# Patient Record
Sex: FEMALE | Race: OTHER
[De-identification: names, ages, dates, MRNs, and addresses within clinical notes are randomized per-mention and may not be internally consistent; named-entity substitution may affect disease eponyms.]

---

## 2019-01-20 ENCOUNTER — HOSPITAL ENCOUNTER (EMERGENCY)
Dept: HOSPITAL 12 - ER | Age: 74
Discharge: HOME | End: 2019-01-20
Payer: COMMERCIAL

## 2019-01-20 VITALS — WEIGHT: 140 LBS | BODY MASS INDEX: 27.48 KG/M2 | HEIGHT: 60 IN

## 2019-01-20 VITALS — SYSTOLIC BLOOD PRESSURE: 139 MMHG | DIASTOLIC BLOOD PRESSURE: 84 MMHG

## 2019-01-20 DIAGNOSIS — Z79.899: ICD-10-CM

## 2019-01-20 DIAGNOSIS — H83.09: Primary | ICD-10-CM

## 2019-01-20 DIAGNOSIS — E11.9: ICD-10-CM

## 2019-01-20 PROCEDURE — A4663 DIALYSIS BLOOD PRESSURE CUFF: HCPCS

## 2019-03-11 ENCOUNTER — HOSPITAL ENCOUNTER (EMERGENCY)
Dept: HOSPITAL 12 - ER | Age: 74
Discharge: HOME | End: 2019-03-11
Payer: COMMERCIAL

## 2019-03-11 VITALS — SYSTOLIC BLOOD PRESSURE: 119 MMHG | DIASTOLIC BLOOD PRESSURE: 71 MMHG

## 2019-03-11 VITALS — WEIGHT: 150 LBS | HEIGHT: 60 IN | BODY MASS INDEX: 29.45 KG/M2

## 2019-03-11 DIAGNOSIS — Z79.899: ICD-10-CM

## 2019-03-11 DIAGNOSIS — M54.12: Primary | ICD-10-CM

## 2019-03-11 PROCEDURE — A4663 DIALYSIS BLOOD PRESSURE CUFF: HCPCS

## 2019-03-11 NOTE — NUR
Patient discharged to home in stable conditon.  Written and verbal after care 
instructions given. 

Patient verbalizes understanding of instructions.PT WALKS I NSYOSI AGIT. 
TRANSLATION FROM Turkmen PROVIDED BY FAMILY MEMBER.

## 2020-01-13 ENCOUNTER — HOSPITAL ENCOUNTER (EMERGENCY)
Dept: HOSPITAL 12 - ER | Age: 75
Discharge: HOME | End: 2020-01-13
Payer: MEDICAID

## 2020-01-13 VITALS — BODY MASS INDEX: 28.27 KG/M2 | HEIGHT: 60 IN | WEIGHT: 144 LBS

## 2020-01-13 VITALS — SYSTOLIC BLOOD PRESSURE: 130 MMHG | DIASTOLIC BLOOD PRESSURE: 79 MMHG

## 2020-01-13 DIAGNOSIS — Z79.84: ICD-10-CM

## 2020-01-13 DIAGNOSIS — R11.2: Primary | ICD-10-CM

## 2020-01-13 DIAGNOSIS — E11.9: ICD-10-CM

## 2020-01-13 LAB
ALP SERPL-CCNC: 214 U/L (ref 50–136)
ALT SERPL W/O P-5'-P-CCNC: 47 U/L (ref 14–59)
APPEARANCE UR: (no result)
AST SERPL-CCNC: 31 U/L (ref 15–37)
BASOPHILS # BLD AUTO: 0.1 K/UL (ref 0–8)
BASOPHILS NFR BLD AUTO: 0.5 % (ref 0–2)
BILIRUB DIRECT SERPL-MCNC: 0.1 MG/DL (ref 0–0.2)
BILIRUB SERPL-MCNC: 0.4 MG/DL (ref 0.2–1)
BILIRUB UR QL STRIP: NEGATIVE
BUN SERPL-MCNC: 14 MG/DL (ref 7–18)
CHLORIDE SERPL-SCNC: 101 MMOL/L (ref 98–107)
CO2 SERPL-SCNC: 27 MMOL/L (ref 21–32)
COLOR UR: YELLOW
CREAT SERPL-MCNC: 0.8 MG/DL (ref 0.6–1.3)
DEPRECATED SQUAMOUS URNS QL MICRO: (no result) /HPF
EOSINOPHIL # BLD AUTO: 0 K/UL (ref 0–0.7)
EOSINOPHIL NFR BLD AUTO: 0.5 % (ref 0–7)
GLUCOSE SERPL-MCNC: 231 MG/DL (ref 74–106)
GLUCOSE UR STRIP-MCNC: NEGATIVE MG/DL
HCT VFR BLD AUTO: 42.3 % (ref 31.2–41.9)
HGB BLD-MCNC: 14.1 G/DL (ref 10.9–14.3)
HGB UR QL STRIP: NEGATIVE
KETONES UR STRIP-MCNC: NEGATIVE MG/DL
LEUKOCYTE ESTERASE UR QL STRIP: NEGATIVE
LIPASE SERPL-CCNC: 114 U/L (ref 73–393)
LYMPHOCYTES # BLD AUTO: 1.8 K/UL (ref 20–40)
LYMPHOCYTES NFR BLD AUTO: 17.1 % (ref 20.5–51.5)
MCH RBC QN AUTO: 28.8 UUG (ref 24.7–32.8)
MCHC RBC AUTO-ENTMCNC: 33 G/DL (ref 32.3–35.6)
MCV RBC AUTO: 86.3 FL (ref 75.5–95.3)
MONOCYTES # BLD AUTO: 0.5 K/UL (ref 2–10)
MONOCYTES NFR BLD AUTO: 5.1 % (ref 0–11)
MUCOUS THREADS URNS QL MICRO: (no result) /LPF
NEUTROPHILS # BLD AUTO: 8 K/UL (ref 1.8–8.9)
NEUTROPHILS NFR BLD AUTO: 76.8 % (ref 38.5–71.5)
NITRITE UR QL STRIP: NEGATIVE
PH UR STRIP: 6 [PH] (ref 5–8)
PLATELET # BLD AUTO: 277 K/UL (ref 179–408)
POTASSIUM SERPL-SCNC: 4.2 MMOL/L (ref 3.5–5.1)
RBC # BLD AUTO: 4.91 MIL/UL (ref 3.63–4.92)
RBC #/AREA URNS HPF: (no result) /HPF (ref 0–3)
SP GR UR STRIP: 1.02 (ref 1–1.03)
UROBILINOGEN UR STRIP-MCNC: 0.2 E.U./DL
WBC # BLD AUTO: 10.4 K/UL (ref 3.8–11.8)
WBC #/AREA URNS HPF: (no result) /HPF
WBC #/AREA URNS HPF: (no result) /HPF (ref 0–3)
WS STN SPEC: 8 G/DL (ref 6.4–8.2)

## 2020-01-13 PROCEDURE — 99283 EMERGENCY DEPT VISIT LOW MDM: CPT

## 2020-01-13 PROCEDURE — 81001 URINALYSIS AUTO W/SCOPE: CPT

## 2020-01-13 PROCEDURE — 36415 COLL VENOUS BLD VENIPUNCTURE: CPT

## 2020-01-13 PROCEDURE — 80076 HEPATIC FUNCTION PANEL: CPT

## 2020-01-13 PROCEDURE — 81000 URINALYSIS NONAUTO W/SCOPE: CPT

## 2020-01-13 PROCEDURE — 96374 THER/PROPH/DIAG INJ IV PUSH: CPT

## 2020-01-13 PROCEDURE — 96375 TX/PRO/DX INJ NEW DRUG ADDON: CPT

## 2020-01-13 PROCEDURE — 96361 HYDRATE IV INFUSION ADD-ON: CPT

## 2020-01-13 PROCEDURE — 83690 ASSAY OF LIPASE: CPT

## 2020-01-13 PROCEDURE — A4663 DIALYSIS BLOOD PRESSURE CUFF: HCPCS

## 2020-01-13 PROCEDURE — 80048 BASIC METABOLIC PNL TOTAL CA: CPT

## 2020-01-13 PROCEDURE — 85025 COMPLETE CBC W/AUTO DIFF WBC: CPT

## 2020-01-13 NOTE — NUR
Pt denies nausea and abdominal discomfort at this time. Resting in bed w/ 
multiple family members at the bedside.